# Patient Record
Sex: MALE | Race: BLACK OR AFRICAN AMERICAN | NOT HISPANIC OR LATINO | Employment: OTHER | URBAN - METROPOLITAN AREA
[De-identification: names, ages, dates, MRNs, and addresses within clinical notes are randomized per-mention and may not be internally consistent; named-entity substitution may affect disease eponyms.]

---

## 2024-08-24 ENCOUNTER — HOSPITAL ENCOUNTER (OUTPATIENT)
Facility: HOSPITAL | Age: 69
Setting detail: OBSERVATION
End: 2024-08-24
Attending: EMERGENCY MEDICINE | Admitting: NURSE PRACTITIONER
Payer: COMMERCIAL

## 2024-08-24 ENCOUNTER — CLINICAL SUPPORT (OUTPATIENT)
Dept: EMERGENCY MEDICINE | Facility: HOSPITAL | Age: 69
End: 2024-08-24
Payer: COMMERCIAL

## 2024-08-24 ENCOUNTER — APPOINTMENT (OUTPATIENT)
Dept: RADIOLOGY | Facility: HOSPITAL | Age: 69
End: 2024-08-24
Payer: COMMERCIAL

## 2024-08-24 DIAGNOSIS — N39.0 URINARY TRACT INFECTION WITH HEMATURIA, SITE UNSPECIFIED: ICD-10-CM

## 2024-08-24 DIAGNOSIS — M54.89 BACK PAIN WITHOUT SCIATICA: ICD-10-CM

## 2024-08-24 DIAGNOSIS — R31.9 URINARY TRACT INFECTION WITH HEMATURIA, SITE UNSPECIFIED: ICD-10-CM

## 2024-08-24 DIAGNOSIS — M54.50 ACUTE RIGHT-SIDED LOW BACK PAIN WITHOUT SCIATICA: Primary | ICD-10-CM

## 2024-08-24 PROBLEM — G47.30 SLEEP APNEA: Status: ACTIVE | Noted: 2024-08-24

## 2024-08-24 PROBLEM — E66.01 OBESITY, CLASS III, BMI 40-49.9 (MORBID OBESITY) (MULTI): Status: ACTIVE | Noted: 2018-05-14

## 2024-08-24 LAB
ALBUMIN SERPL BCP-MCNC: 3.7 G/DL (ref 3.4–5)
ALBUMIN SERPL BCP-MCNC: 3.7 G/DL (ref 3.4–5)
ALP SERPL-CCNC: 57 U/L (ref 33–136)
ALP SERPL-CCNC: 58 U/L (ref 33–136)
ALT SERPL W P-5'-P-CCNC: 7 U/L (ref 10–52)
ALT SERPL W P-5'-P-CCNC: 8 U/L (ref 10–52)
ANION GAP SERPL CALC-SCNC: 10 MMOL/L (ref 10–20)
APPEARANCE UR: ABNORMAL
AST SERPL W P-5'-P-CCNC: 16 U/L (ref 9–39)
AST SERPL W P-5'-P-CCNC: 19 U/L (ref 9–39)
ATRIAL RATE: 77 BPM
BACTERIA #/AREA URNS AUTO: ABNORMAL /HPF
BASOPHILS # BLD AUTO: 0.04 X10*3/UL (ref 0–0.1)
BASOPHILS NFR BLD AUTO: 0.6 %
BILIRUB DIRECT SERPL-MCNC: 0.2 MG/DL (ref 0–0.3)
BILIRUB SERPL-MCNC: 0.5 MG/DL (ref 0–1.2)
BILIRUB SERPL-MCNC: 0.6 MG/DL (ref 0–1.2)
BILIRUB UR STRIP.AUTO-MCNC: NEGATIVE MG/DL
BNP SERPL-MCNC: 107 PG/ML (ref 0–99)
BUN SERPL-MCNC: 21 MG/DL (ref 6–23)
CALCIUM SERPL-MCNC: 8.8 MG/DL (ref 8.6–10.6)
CHLORIDE SERPL-SCNC: 100 MMOL/L (ref 98–107)
CO2 SERPL-SCNC: 35 MMOL/L (ref 21–32)
COLOR UR: YELLOW
CREAT SERPL-MCNC: 0.89 MG/DL (ref 0.5–1.3)
CRP SERPL-MCNC: 0.77 MG/DL
EGFRCR SERPLBLD CKD-EPI 2021: >90 ML/MIN/1.73M*2
EOSINOPHIL # BLD AUTO: 0.05 X10*3/UL (ref 0–0.7)
EOSINOPHIL NFR BLD AUTO: 0.8 %
ERYTHROCYTE [DISTWIDTH] IN BLOOD BY AUTOMATED COUNT: 21.3 % (ref 11.5–14.5)
GLUCOSE SERPL-MCNC: 89 MG/DL (ref 74–99)
GLUCOSE UR STRIP.AUTO-MCNC: NORMAL MG/DL
GRAN CASTS #/AREA UR COMP ASSIST: ABNORMAL /LPF
HCT VFR BLD AUTO: 32.7 % (ref 41–52)
HGB BLD-MCNC: 9.4 G/DL (ref 13.5–17.5)
HOLD SPECIMEN: NORMAL
HYALINE CASTS #/AREA URNS AUTO: ABNORMAL /LPF
HYPOCHROMIA BLD QL SMEAR: NORMAL
IMM GRANULOCYTES # BLD AUTO: 0.02 X10*3/UL (ref 0–0.7)
IMM GRANULOCYTES NFR BLD AUTO: 0.3 % (ref 0–0.9)
KETONES UR STRIP.AUTO-MCNC: NEGATIVE MG/DL
LEUKOCYTE ESTERASE UR QL STRIP.AUTO: ABNORMAL
LYMPHOCYTES # BLD AUTO: 0.69 X10*3/UL (ref 1.2–4.8)
LYMPHOCYTES NFR BLD AUTO: 11.1 %
MCH RBC QN AUTO: 21.5 PG (ref 26–34)
MCHC RBC AUTO-ENTMCNC: 28.7 G/DL (ref 32–36)
MCV RBC AUTO: 75 FL (ref 80–100)
MONOCYTES # BLD AUTO: 0.58 X10*3/UL (ref 0.1–1)
MONOCYTES NFR BLD AUTO: 9.3 %
MUCOUS THREADS #/AREA URNS AUTO: ABNORMAL /LPF
NEUTROPHILS # BLD AUTO: 4.86 X10*3/UL (ref 1.2–7.7)
NEUTROPHILS NFR BLD AUTO: 77.9 %
NITRITE UR QL STRIP.AUTO: NEGATIVE
NRBC BLD-RTO: 0 /100 WBCS (ref 0–0)
P AXIS: 66 DEGREES
P OFFSET: 131 MS
P ONSET: 93 MS
PH UR STRIP.AUTO: 6.5 [PH]
PLATELET # BLD AUTO: 111 X10*3/UL (ref 150–450)
POTASSIUM SERPL-SCNC: 3.8 MMOL/L (ref 3.5–5.3)
PR INTERVAL: 262 MS
PROT SERPL-MCNC: 6.8 G/DL (ref 6.4–8.2)
PROT SERPL-MCNC: 6.9 G/DL (ref 6.4–8.2)
PROT UR STRIP.AUTO-MCNC: ABNORMAL MG/DL
Q ONSET: 224 MS
QRS COUNT: 12 BEATS
QRS DURATION: 82 MS
QT INTERVAL: 394 MS
QTC CALCULATION(BAZETT): 445 MS
QTC FREDERICIA: 428 MS
R AXIS: 5 DEGREES
RBC # BLD AUTO: 4.37 X10*6/UL (ref 4.5–5.9)
RBC # UR STRIP.AUTO: ABNORMAL /UL
RBC #/AREA URNS AUTO: ABNORMAL /HPF
RBC MORPH BLD: NORMAL
SARS-COV-2 RNA RESP QL NAA+PROBE: NOT DETECTED
SODIUM SERPL-SCNC: 141 MMOL/L (ref 136–145)
SP GR UR STRIP.AUTO: 1.02
T AXIS: 29 DEGREES
T OFFSET: 421 MS
UROBILINOGEN UR STRIP.AUTO-MCNC: NORMAL MG/DL
VENTRICULAR RATE: 77 BPM
WBC # BLD AUTO: 6.2 X10*3/UL (ref 4.4–11.3)
WBC #/AREA URNS AUTO: >50 /HPF

## 2024-08-24 PROCEDURE — 71046 X-RAY EXAM CHEST 2 VIEWS: CPT | Performed by: RADIOLOGY

## 2024-08-24 PROCEDURE — 87635 SARS-COV-2 COVID-19 AMP PRB: CPT | Performed by: EMERGENCY MEDICINE

## 2024-08-24 PROCEDURE — 96372 THER/PROPH/DIAG INJ SC/IM: CPT

## 2024-08-24 PROCEDURE — 99285 EMERGENCY DEPT VISIT HI MDM: CPT | Performed by: EMERGENCY MEDICINE

## 2024-08-24 PROCEDURE — 86140 C-REACTIVE PROTEIN: CPT | Performed by: NURSE PRACTITIONER

## 2024-08-24 PROCEDURE — 36415 COLL VENOUS BLD VENIPUNCTURE: CPT

## 2024-08-24 PROCEDURE — G0378 HOSPITAL OBSERVATION PER HR: HCPCS

## 2024-08-24 PROCEDURE — 85025 COMPLETE CBC W/AUTO DIFF WBC: CPT

## 2024-08-24 PROCEDURE — 2500000001 HC RX 250 WO HCPCS SELF ADMINISTERED DRUGS (ALT 637 FOR MEDICARE OP): Performed by: NURSE PRACTITIONER

## 2024-08-24 PROCEDURE — 80076 HEPATIC FUNCTION PANEL: CPT | Mod: CCI | Performed by: NURSE PRACTITIONER

## 2024-08-24 PROCEDURE — 2500000005 HC RX 250 GENERAL PHARMACY W/O HCPCS

## 2024-08-24 PROCEDURE — 80053 COMPREHEN METABOLIC PANEL: CPT

## 2024-08-24 PROCEDURE — 99285 EMERGENCY DEPT VISIT HI MDM: CPT | Mod: 25

## 2024-08-24 PROCEDURE — 74177 CT ABD & PELVIS W/CONTRAST: CPT | Mod: FOREIGN READ | Performed by: RADIOLOGY

## 2024-08-24 PROCEDURE — 99222 1ST HOSP IP/OBS MODERATE 55: CPT | Performed by: NURSE PRACTITIONER

## 2024-08-24 PROCEDURE — 83880 ASSAY OF NATRIURETIC PEPTIDE: CPT

## 2024-08-24 PROCEDURE — 81001 URINALYSIS AUTO W/SCOPE: CPT

## 2024-08-24 PROCEDURE — 2500000004 HC RX 250 GENERAL PHARMACY W/ HCPCS (ALT 636 FOR OP/ED)

## 2024-08-24 PROCEDURE — 2550000001 HC RX 255 CONTRASTS: Performed by: EMERGENCY MEDICINE

## 2024-08-24 PROCEDURE — 93005 ELECTROCARDIOGRAM TRACING: CPT

## 2024-08-24 PROCEDURE — 71046 X-RAY EXAM CHEST 2 VIEWS: CPT

## 2024-08-24 PROCEDURE — 74177 CT ABD & PELVIS W/CONTRAST: CPT

## 2024-08-24 PROCEDURE — 96375 TX/PRO/DX INJ NEW DRUG ADDON: CPT

## 2024-08-24 PROCEDURE — 87086 URINE CULTURE/COLONY COUNT: CPT

## 2024-08-24 PROCEDURE — 96365 THER/PROPH/DIAG IV INF INIT: CPT | Mod: 59

## 2024-08-24 RX ORDER — FLUTICASONE PROPIONATE AND SALMETEROL 50; 250 UG/1; UG/1
1 POWDER RESPIRATORY (INHALATION)
COMMUNITY
Start: 2023-10-30 | End: 2024-08-26 | Stop reason: HOSPADM

## 2024-08-24 RX ORDER — FLUTICASONE FUROATE AND VILANTEROL 200; 25 UG/1; UG/1
1 POWDER RESPIRATORY (INHALATION)
Status: DISCONTINUED | OUTPATIENT
Start: 2024-08-25 | End: 2024-08-25

## 2024-08-24 RX ORDER — ACETAMINOPHEN 325 MG/1
650 TABLET ORAL EVERY 6 HOURS PRN
Status: DISCONTINUED | OUTPATIENT
Start: 2024-08-24 | End: 2024-08-26 | Stop reason: HOSPADM

## 2024-08-24 RX ORDER — MORPHINE SULFATE 4 MG/ML
4 INJECTION INTRAVENOUS ONCE
Status: COMPLETED | OUTPATIENT
Start: 2024-08-24 | End: 2024-08-24

## 2024-08-24 RX ORDER — ACETAMINOPHEN 325 MG/1
975 TABLET ORAL ONCE
Status: COMPLETED | OUTPATIENT
Start: 2024-08-24 | End: 2024-08-24

## 2024-08-24 RX ORDER — PANTOPRAZOLE SODIUM 40 MG/1
40 TABLET, DELAYED RELEASE ORAL DAILY
Status: DISCONTINUED | OUTPATIENT
Start: 2024-08-24 | End: 2024-08-26 | Stop reason: HOSPADM

## 2024-08-24 RX ORDER — FUROSEMIDE 10 MG/ML
40 INJECTION INTRAMUSCULAR; INTRAVENOUS ONCE
Status: COMPLETED | OUTPATIENT
Start: 2024-08-24 | End: 2024-08-24

## 2024-08-24 RX ORDER — ACETAMINOPHEN 500 MG
5 TABLET ORAL NIGHTLY PRN
Status: DISCONTINUED | OUTPATIENT
Start: 2024-08-24 | End: 2024-08-26 | Stop reason: HOSPADM

## 2024-08-24 RX ORDER — LIDOCAINE 560 MG/1
1 PATCH PERCUTANEOUS; TOPICAL; TRANSDERMAL ONCE
Status: COMPLETED | OUTPATIENT
Start: 2024-08-24 | End: 2024-08-24

## 2024-08-24 RX ORDER — OXYCODONE HYDROCHLORIDE 5 MG/1
5 TABLET ORAL EVERY 6 HOURS PRN
Status: DISCONTINUED | OUTPATIENT
Start: 2024-08-24 | End: 2024-08-24

## 2024-08-24 RX ORDER — AMOXICILLIN 250 MG
2 CAPSULE ORAL 2 TIMES DAILY
Status: DISCONTINUED | OUTPATIENT
Start: 2024-08-24 | End: 2024-08-26 | Stop reason: HOSPADM

## 2024-08-24 RX ORDER — METHOCARBAMOL 500 MG/1
500 TABLET, FILM COATED ORAL EVERY 8 HOURS PRN
Status: DISCONTINUED | OUTPATIENT
Start: 2024-08-24 | End: 2024-08-26 | Stop reason: HOSPADM

## 2024-08-24 RX ORDER — OXYCODONE HYDROCHLORIDE 5 MG/1
5 TABLET ORAL EVERY 6 HOURS PRN
Status: DISCONTINUED | OUTPATIENT
Start: 2024-08-24 | End: 2024-08-26 | Stop reason: HOSPADM

## 2024-08-24 RX ORDER — FUROSEMIDE 40 MG/1
40 TABLET ORAL DAILY
Status: DISCONTINUED | OUTPATIENT
Start: 2024-08-25 | End: 2024-08-26 | Stop reason: HOSPADM

## 2024-08-24 RX ORDER — CEFTRIAXONE 1 G/50ML
1 INJECTION, SOLUTION INTRAVENOUS EVERY 24 HOURS
Status: DISCONTINUED | OUTPATIENT
Start: 2024-08-25 | End: 2024-08-26 | Stop reason: HOSPADM

## 2024-08-24 RX ORDER — ORPHENADRINE CITRATE 30 MG/ML
60 INJECTION INTRAMUSCULAR; INTRAVENOUS ONCE
Status: COMPLETED | OUTPATIENT
Start: 2024-08-24 | End: 2024-08-24

## 2024-08-24 RX ORDER — ENOXAPARIN SODIUM 100 MG/ML
40 INJECTION SUBCUTANEOUS EVERY 24 HOURS
Status: DISCONTINUED | OUTPATIENT
Start: 2024-08-24 | End: 2024-08-26 | Stop reason: HOSPADM

## 2024-08-24 RX ORDER — CEFTRIAXONE 1 G/50ML
1 INJECTION, SOLUTION INTRAVENOUS ONCE
Status: COMPLETED | OUTPATIENT
Start: 2024-08-24 | End: 2024-08-24

## 2024-08-24 RX ORDER — METHOCARBAMOL 500 MG/1
500 TABLET, FILM COATED ORAL EVERY 8 HOURS SCHEDULED
Status: DISCONTINUED | OUTPATIENT
Start: 2024-08-24 | End: 2024-08-24

## 2024-08-24 RX ORDER — FUROSEMIDE 40 MG/1
40 TABLET ORAL DAILY
COMMUNITY
Start: 2024-07-26

## 2024-08-24 RX ORDER — LISINOPRIL 30 MG/1
30 TABLET ORAL DAILY
COMMUNITY
Start: 2024-07-26

## 2024-08-24 RX ADMIN — PANTOPRAZOLE SODIUM 40 MG: 40 TABLET, DELAYED RELEASE ORAL at 21:03

## 2024-08-24 RX ADMIN — LIDOCAINE 1 PATCH: 4 PATCH TOPICAL at 08:18

## 2024-08-24 RX ADMIN — IOHEXOL 90 ML: 350 INJECTION, SOLUTION INTRAVENOUS at 14:26

## 2024-08-24 RX ADMIN — ACETAMINOPHEN 975 MG: 325 TABLET ORAL at 08:17

## 2024-08-24 RX ADMIN — ORPHENADRINE CITRATE 60 MG: 60 INJECTION INTRAMUSCULAR; INTRAVENOUS at 08:17

## 2024-08-24 RX ADMIN — CEFTRIAXONE SODIUM 1 G: 1 INJECTION, SOLUTION INTRAVENOUS at 10:57

## 2024-08-24 RX ADMIN — MORPHINE SULFATE 4 MG: 4 INJECTION INTRAVENOUS at 10:51

## 2024-08-24 RX ADMIN — SENNOSIDES AND DOCUSATE SODIUM 2 TABLET: 50; 8.6 TABLET ORAL at 21:03

## 2024-08-24 RX ADMIN — FUROSEMIDE 40 MG: 10 INJECTION, SOLUTION INTRAMUSCULAR; INTRAVENOUS at 15:15

## 2024-08-24 ASSESSMENT — ENCOUNTER SYMPTOMS
BACK PAIN: 1
CONFUSION: 0
ABDOMINAL PAIN: 0
DIFFICULTY URINATING: 0
VOMITING: 0
NAUSEA: 0
CONSTIPATION: 0

## 2024-08-24 ASSESSMENT — COLUMBIA-SUICIDE SEVERITY RATING SCALE - C-SSRS
2. HAVE YOU ACTUALLY HAD ANY THOUGHTS OF KILLING YOURSELF?: NO
1. IN THE PAST MONTH, HAVE YOU WISHED YOU WERE DEAD OR WISHED YOU COULD GO TO SLEEP AND NOT WAKE UP?: NO
6. HAVE YOU EVER DONE ANYTHING, STARTED TO DO ANYTHING, OR PREPARED TO DO ANYTHING TO END YOUR LIFE?: NO

## 2024-08-24 ASSESSMENT — PAIN SCALES - GENERAL
PAINLEVEL_OUTOF10: 5 - MODERATE PAIN
PAINLEVEL_OUTOF10: 8
PAINLEVEL_OUTOF10: 5 - MODERATE PAIN

## 2024-08-24 ASSESSMENT — PAIN DESCRIPTION - PAIN TYPE
TYPE: ACUTE PAIN
TYPE: ACUTE PAIN

## 2024-08-24 ASSESSMENT — PAIN DESCRIPTION - ORIENTATION
ORIENTATION: RIGHT
ORIENTATION: RIGHT

## 2024-08-24 ASSESSMENT — PAIN - FUNCTIONAL ASSESSMENT
PAIN_FUNCTIONAL_ASSESSMENT: 0-10
PAIN_FUNCTIONAL_ASSESSMENT: 0-10

## 2024-08-24 ASSESSMENT — PAIN DESCRIPTION - LOCATION
LOCATION: BACK
LOCATION: BACK

## 2024-08-24 NOTE — ED TRIAGE NOTES
Patient here via [facility]. Patient c/o 5/10 right lower back pain that he has for a couple of days, but says get got really bad this morning. Patient has a hx of CHF (on 3Liters baseline)

## 2024-08-24 NOTE — ED PROVIDER NOTES
Emergency Department Provider Note        History of Present Illness     History provided by: Patient  Limitations to History: None  External Records Reviewed with Brief Summary: Care Everywhere visit at [facility] on 8/22/2016 which showed PMHx HTN, visit for foot pain ; telephone encounter from [facility] 8/25/2016 noting patient in cardiac ICU and [facility] has been asked to fax records    HPI:  Neftali Beasley is a 69 y.o. male with reported PMHx CHF, CKD, HTN who presents to the emergency department for back pain.  He reports right sided low back pain that started as a dull ache yesterday after getting off the toilet.  He reports when he woke up this morning and tried to get up it became significantly more intense.  He reports the pain does not radiate, he has no decreased strength in his lower extremities, no numbness/tingling.  Reports no fever/chills.    No HA, lightheadedness, CP, SOB, abdominal pain, dysuria.  He does report bilateral leg swelling which is chronic. Patient reports he uses 2.5L O2 at home.     On chart review, patient has a history of HTN, obesity, prior UTI.  There is mention of a cardiac ICU stay in 2016, however no notes are available from this hospitalization.  Reports no alcohol or recreational drug use, no history of IVDU.    Physical Exam   Triage vitals:  T 35.8 °C (96.4 °F)  HR 78  /67  RR 18  O2 (!) 91 % (S) Supplemental oxygen (3 Liters)    Physical exam:   Triage vitals reviewed.  Constitutional: Well developed adult in no acute distress, non toxic in appearance  Head: Normocephalic, atraumatic  Skin: Intact, dry. No rashes or lesions.  Eyes: Pupils are equal. No conjunctival injection.  Neck: Supple. Trachea is midline.  Pulmonary: Normal work of breathing with no accessory muscle use noted.  Bibasilar crackles on auscultation, no wheezes.  Cardiovascular: Normal rate, regular rhythm. No murmurs/gallops/rubs appreciated. 2+ radial and DP pulses bilaterally.    Abdomen: Soft, nondistended. Nontender to palpation.  Back: Mild tenderness to palpation over right lumbar paraspinal area, no midline tenderness to palpation  Extremities: No gross deformities.  Moving all extremities spontaneously without difficulty.  Neuro: Awake and alert. Face is symmetric. Speech is clear. No obvious focal findings.  Psych: Appropriate mood and affect.      Medical Decision Making & ED Course   Medical Decision Makin y.o. male with reported PMHx CHF, CKD, HTN who presents to the emergency department for right low back pain.  On arrival, patient was afebrile, hemodynamically stable, however saturating 91% on home 2 L NC.  O2 increased to 3L with subsequent improvement in oxygen saturation to 96%.  He was given a lidocaine patch, 975 mg Tylenol, and 60 mg Norflex for pain, with some improvement.  Exam as above.    Exam seems most consistent with lumbar strain versus CVA tenderness due to pyelonephritis.  No midline tenderness or recent trauma so low concern for acute fracture.  No LE weakness, saddle anesthesia, or decree sensation, so low concern for cord compression.  No fever/chills, no history of IVDU, so low concern for epidural abscess.    Laboratory workup shows CBC without leukocytosis, shows anemia with Hgb 9.4.  CMP unremarkable.  UA positive for UTI and patient was started on ceftriaxone.  EKG nonischemic.  BNP very slightly elevated at 107, however CXR shows diffuse interstitial opacities and trace bilateral pleural effusions concerning for volume overload in the setting of CHF.  I suspect that this is the cause of his increase in O2 requirement.  Patient will be given 40 mg IV Lasix to initiate diuresis.  Concern for pyelo versus nephrolithiasis given UTI and blood on UA, so CT AP was obtained and showed hypoattenuating lesion of the left kidney that is incompletely characterized but concerning for cyst as well as a 1.7 cm nonobstructing stone in the left kidney; no right  kidney pathology.  CT also showed hepatic steatosis and diverticulosis without diverticulitis.     Discussed the likely diagnoses of UTI and volume overload with the patient, who expressed understanding.  I recommended admission to the hospital given his increased O2 requirement and patient was in agreement with this plan.  Discussed with admission coordinator and patient was admitted to medicine in stable condition.    ----    Differential diagnoses considered include but are not limited to: Back strain, UTI, pyelonephritis, nephrolithiasis, lumbar fracture, cord compression, epidural abscess, malignancy     Social Determinants of Health which Significantly Impact Care: None identified     EKG Independent Interpretation: EKG interpreted by myself. Please see ED Course for full interpretation.    Independent Result Review and Interpretation: Relevant laboratory and radiographic results were reviewed and independently interpreted by myself.  As necessary, they are commented on in the ED Course.    Chronic conditions affecting the patient's care: As documented above in [facility]    The patient was discussed with the following consultants/services: Admission Coordinator who accepted the patient for admission    Care Considerations: As documented above in [facility]    ED Course:  ED Course as of 08/24/24 2113   Sat Aug 24, 2024   0901 ECG 12 lead  EKG interpreted by me (ED resident): 77 bpm, sinus arrhythmia with first-degree block and PVC.  Normal axis.  , QTc 445, QRS 82.  No ST elevations, depressions, or T wave inversions concerning for ischemia.  Compared with prior EKG on 9/4/2016, PVCs are now present; no other significant changes. [HH]      ED Course User Index  [HH] Sylvie Rondon MD         Diagnoses as of 08/24/24 2113   Acute right-sided low back pain without sciatica   Urinary tract infection with hematuria, site unspecified     Disposition   As a result of their workup, the patient will require admission to the  [facility].  The patient was informed of his diagnosis.  The patient was given the opportunity to ask questions and I answered them. The patient agreed to be admitted to the hospital.      Patient seen and discussed with ED attending physician.    Sylvie Rondon MD  Emergency Medicine     Sylvie Rondon MD  Resident  08/24/24 [id number]

## 2024-08-24 NOTE — H&P
History Of Present Illness  Neftali Beasley is a 69 y.o. male with a PMH of HFpEF (last EF 70-75% 8/22/16), ROSALINDA, HTN, and obesity. Pt presented to ED this morning for further evaluation of right sided lower back pain that started yesterday while he was getting off the toilet. Pt reports that when he attempted to get out of bed this morning pain was more intense. Back pain does not radiate per report of pt and it is no associated with numbness or tingling, fever, chills, loss of bowel/bladder function, or decreased strength in BLE. Pt also endorses BLE edema which his chronic. Pt currently on 2.5L NC which he wears at baseline. Labs obtained on admit notable for UTI, elevated BNP, anemia, and thrombocytopenia. CT A/P with contrast showed gastritis, non-obstructing kidney stone, hepatic steatosis with morphologic changes of underlying cirrhosis, and colonic diverticulosis without findings of diverticulitis. CXR showed diffuse interstitial opacities throughout the bilateral lungs with trace bilateral pleural effusions which may represent mild pulmonary edema. Lasix 40 mg IV push x1 dose given while in the ED. IV antibiotic initiated while in the ED for treatment of UTI. Pt admitted to medicine service under observation for treatment of UTI and back pain.      Past Medical History  No past medical history on file.    Surgical History  No past surgical history on file.     Social History  He has no history on file for tobacco use, alcohol use, and drug use.    Family History  No family history on file.     Allergies  Patient has no known allergies.    Review of Systems   Cardiovascular:  Positive for leg swelling. Negative for chest pain.   Gastrointestinal:  Negative for abdominal pain, constipation, nausea and vomiting.   Genitourinary:  Negative for difficulty urinating.   Musculoskeletal:  Positive for back pain.   Skin: Negative.    Psychiatric/Behavioral:  Negative for confusion.         Physical  Exam  Constitutional:       General: He is not in acute distress.     Appearance: He is obese.   HENT:      Head: Normocephalic.      Nose: Nose normal.      Mouth/Throat:      Mouth: Mucous membranes are dry.   Cardiovascular:      Rate and Rhythm: Normal rate.      Pulses: Normal pulses.      Heart sounds: Normal heart sounds.   Pulmonary:      Effort: Pulmonary effort is normal. No respiratory distress.      Breath sounds: Normal breath sounds.      Comments: Pt currently on 2.5 L  Abdominal:      Palpations: Abdomen is soft.   Musculoskeletal:         General: Normal range of motion.      Cervical back: Normal range of motion.      Right lower leg: Edema present.      Left lower leg: Edema present.   Skin:     General: Skin is warm and dry.   Neurological:      Mental Status: He is oriented to person, place, and time.        Last Recorded Vitals  Blood pressure 145/75, pulse 74, temperature 36 °C (96.8 °F), temperature source Oral, resp. rate 19, SpO2 100%.    Relevant Results    CT abdomen pelvis w IV contrast    Result Date: 8/24/2024  STUDY: CT Abdomen and Pelvis with IV Contrast; 8/24/2024, 1433 INDICATION: Right flank pain. COMPARISON: None Available. ACCESSION NUMBER(S): [id number] ORDERING CLINICIAN: VANESSA PUCKETT TECHNIQUE: CT of the abdomen and pelvis was performed.  Contiguous axial images were obtained at 3 mm slice thickness through the abdomen and pelvis. Coronal and sagittal reconstructions at 3 mm slice thickness were performed.  Omnipaque 350, 90 mL was administered intravenously.  FINDINGS: LOWER CHEST: No cardiomegaly.  No pericardial effusion.  Elevation of the right hemidiaphragm with associated right basilar atelectasis.  Minimal coronary calcifications  ABDOMEN:  LIVER: No hepatomegaly.  Nodular contour the liver compatible with underlying cirrhosis  There is fatty infiltration of the liver  BILE DUCTS: No intrahepatic or extrahepatic biliary ductal dilatation.  GALLBLADDER: The  gallbladder is present without definite gallstones. STOMACH: Question gastric wall thickening.  PANCREAS: No masses or ductal dilatation.  SPLEEN: No splenomegaly or focal splenic lesion.  ADRENAL GLANDS: No thickening or nodules.  KIDNEYS AND URETERS: Kidneys are normal in size and location.  4.4 cm low-attenuation lesion in the inferior pole left kidney measuring above water attenuation.  Nonobstructing 1.7 cm calculus in the left kidney without ureteral calculus or hydronephrosis.  PELVIS:  BLADDER: No abnormalities identified.  REPRODUCTIVE ORGANS: No abnormalities identified.  BOWEL: Colonic diverticulosis without findings of diverticulitis.  Appendix is normal.  VESSELS: No abnormalities identified.  Abdominal aorta is normal in caliber.  PERITONEUM/RETROPERITONEUM/LYMPH NODES: No free fluid.  No pneumoperitoneum. No lymphadenopathy.  ABDOMINAL WALL: No abnormalities identified. SOFT TISSUES: No abnormalities identified.  BONES: No acute fracture or aggressive osseous lesion.  Multilevel degenerative changes of the lumbar spine.    1. Mild gastric wall thickening. Correlate clinically for gastritis. 2. 4.4 cm low-attenuation lesion in the inferior pole left kidney measuring above water attenuation. Findings may be secondary to a hyperdense cyst but technically indeterminate. Recommend further evaluation with renal ultrasound. 3. Nonobstructing 1.7 cm calculus in the left kidney without ureteral calculus or hydronephrosis. 4. Hepatic steatosis with morphologic changes of underlying cirrhosis. 5. Colonic diverticulosis without findings of diverticulitis. 6. Minimal coronary artery calcifications. Signed by Garland Vasquez MD    XR chest 2 views    Result Date: 8/24/2024  Interpreted By:  Sourav Horner,  and Poly Eaton STUDY: XR CHEST 2 VIEWS;  8/24/2024 8:46 am   INDICATION: Signs/Symptoms:Back pain, increased O2 requirement.   COMPARISON: Several prior chest radiographs most recently 09/11/2016   ACCESSION  NUMBER(S): [id number]   ORDERING CLINICIAN: NEVAEH DRAPER   FINDINGS: PA and lateral radiographs of the chest were provided.     CARDIOMEDIASTINAL SILHOUETTE: Prominent cardiomediastinal silhouette, likely secondary to patient positioning, similar in appearance as compared to most recent prior.   LUNGS: Low lung volumes contributing to bronchovascular crowding. Diffuse interstitial opacities throughout the bilateral lungs. Blunting of the bilateral costophrenic angles. No evidence of pneumothorax.   ABDOMEN: No remarkable upper abdominal findings.   BONES: No acute osseous changes.       1.  Diffuse interstitial opacities throughout the bilateral lungs with trace bilateral pleural effusions, which may represent mild pulmonary edema. 2. Prominent cardiomediastinal silhouette, likely secondary to low lung volumes and patient positioning, similar in appearance as compared to the most recent prior.   I personally reviewed the images/study and resident's interpretation and I agree with the findings as stated by Uma Pang MD (resident radiologist). This study was analyzed and interpreted at [place], [place].   MACRO: None   Signed by: Sourav Horner 8/24/2024 9:40 AM Dictation workstation:   [id number]      Results for orders placed or performed during the hospital encounter of 08/24/24 (from the past 24 hour(s))   CBC and Auto Differential   Result Value Ref Range    WBC 6.2 4.4 - 11.3 x10*3/uL    nRBC 0.0 0.0 - 0.0 /100 WBCs    RBC 4.37 (L) 4.50 - 5.90 x10*6/uL    Hemoglobin 9.4 (L) 13.5 - 17.5 g/dL    Hematocrit 32.7 (L) 41.0 - 52.0 %    MCV 75 (L) 80 - 100 fL    MCH 21.5 (L) 26.0 - 34.0 pg    MCHC 28.7 (L) 32.0 - 36.0 g/dL    RDW 21.3 (H) 11.5 - 14.5 %    Platelets 111 (L) 150 - 450 x10*3/uL    Neutrophils % 77.9 40.0 - 80.0 %    Immature Granulocytes %, Automated 0.3 0.0 - 0.9 %    Lymphocytes % 11.1 13.0 - 44.0 %    Monocytes % 9.3 2.0 - 10.0 %    Eosinophils % 0.8  0.0 - 6.0 %    Basophils % 0.6 0.0 - 2.0 %    Neutrophils Absolute 4.86 1.20 - 7.70 x10*3/uL    Immature Granulocytes Absolute, Automated 0.02 0.00 - 0.70 x10*3/uL    Lymphocytes Absolute 0.69 (L) 1.20 - 4.80 x10*3/uL    Monocytes Absolute 0.58 0.10 - 1.00 x10*3/uL    Eosinophils Absolute 0.05 0.00 - 0.70 x10*3/uL    Basophils Absolute 0.04 0.00 - 0.10 x10*3/uL   B-type natriuretic peptide   Result Value Ref Range     (H) 0 - 99 pg/mL   Morphology   Result Value Ref Range    RBC Morphology See Below     Hypochromia Marked    Sars-CoV-2 PCR   Result Value Ref Range    Coronavirus 2019, PCR Not Detected Not Detected   Urinalysis with Reflex Culture and Microscopic   Result Value Ref Range    Color, Urine Yellow Light-Yellow, Yellow, Dark-Yellow    Appearance, Urine Turbid (N) Clear    Specific Gravity, Urine 1.024 1.005 - 1.035    pH, Urine 6.5 5.0, 5.5, 6.0, 6.5, 7.0, 7.5, 8.0    Protein, Urine 50 (1+) (A) NEGATIVE, 10 (TRACE), 20 (TRACE) mg/dL    Glucose, Urine Normal Normal mg/dL    Blood, Urine 0.03 (TRACE) (A) NEGATIVE    Ketones, Urine NEGATIVE NEGATIVE mg/dL    Bilirubin, Urine NEGATIVE NEGATIVE    Urobilinogen, Urine Normal Normal mg/dL    Nitrite, Urine NEGATIVE NEGATIVE    Leukocyte Esterase, Urine 500 Carlos/µL (A) NEGATIVE   Microscopic Only, Urine   Result Value Ref Range    WBC, Urine >50 (A) 1-5, NONE /HPF    RBC, Urine 6-10 (A) NONE, 1-2, 3-5 /HPF    Bacteria, Urine 1+ (A) NONE SEEN /HPF    Mucus, Urine FEW Reference range not established. /LPF    Hyaline Casts, Urine 1+ (A) NONE /LPF    Fine Granular Casts, Urine 1+ (A) NONE /LPF   Comprehensive metabolic panel   Result Value Ref Range    Glucose 89 74 - 99 mg/dL    Sodium 141 136 - 145 mmol/L    Potassium 3.8 3.5 - 5.3 mmol/L    Chloride 100 98 - 107 mmol/L    Bicarbonate 35 (H) 21 - 32 mmol/L    Anion Gap 10 10 - 20 mmol/L    Urea Nitrogen 21 6 - 23 mg/dL    Creatinine 0.89 0.50 - 1.30 mg/dL    eGFR >90 >60 mL/min/1.73m*2    Calcium 8.8 8.6 -  10.6 mg/dL    Albumin 3.7 3.4 - 5.0 g/dL    Alkaline Phosphatase 57 33 - 136 U/L    Total Protein 6.9 6.4 - 8.2 g/dL    AST 16 9 - 39 U/L    Bilirubin, Total 0.5 0.0 - 1.2 mg/dL    ALT 7 (L) 10 - 52 U/L           ED Medication Administration from 08/24/2024 0644 to 08/24/2024 1829         Date/Time Order Dose Route Action Action by     08/24/2024 0817 EDT acetaminophen (Tylenol) tablet 975 mg 975 mg oral Given NEDA Farley     08/24/2024 0817 EDT orphenadrine (Norflex) injection 60 mg 60 mg intramuscular Given NEDA Farley     08/24/2024 0818 EDT lidocaine 4 % patch 1 patch 1 patch transdermal Medication Applied NEDA Farley     08/24/2024 1051 EDT morphine injection 4 mg 4 mg intravenous Given SEMAJ Arteaga     08/24/2024 1057 EDT cefTRIAXone (Rocephin) 1 g in dextrose (iso) IV 50 mL 1 g intravenous New Bag NEDA Farley     08/24/2024 1127 EDT cefTRIAXone (Rocephin) 1 g in dextrose (iso) IV 50 mL 0 g intravenous Stopped NEDA Farley     08/24/2024 1426 EDT iohexol (OMNIPaque) 350 mg iodine/mL solution 90 mL 90 mL intravenous Given PHONG Brenner     08/24/2024 1515 EDT furosemide (Lasix) injection 40 mg 40 mg intravenous Given KRISTEN Mason           Scheduled medications  [START ON 8/25/2024] cefTRIAXone, 1 g, intravenous, q24h  enoxaparin, 40 mg, subcutaneous, q24h  [START ON 8/25/2024] fluticasone furoate-vilanteroL, 1 puff, inhalation, Daily  [START ON 8/25/2024] furosemide, 40 mg, oral, Daily  lidocaine, 1 patch, transdermal, Once  [START ON 8/25/2024] lisinopril, 30 mg, oral, Daily  pantoprazole, 40 mg, oral, Daily  sennosides-docusate sodium, 2 tablet, oral, BID      Continuous medications     PRN medications  PRN medications: acetaminophen, melatonin, methocarbamol, oxyCODONE, psyllium       Assessment/Plan     #back pain  - PT/OT consulted  - Tylenol 650 mg q6h PRN mild pain, Oxycodone 5 mg q6h PRN mod-severe pain (x4 doses), Lidocaine patch, and Robaxin 500 mg PO q8h PRN back pain/spasms    #UTI  - Urine culture: Pending results  with sensitivities   - No prior positive urine culture results found in medical record  - Antibiotic Regimen: Rocephin 1g Q24hr (8/24-).Transition to PO antibiotic at discharge   - Monitor kidney function; Provide maintenance fluids if needed; Encourage oral hydration     #gastritis  #fatty liver  - CT A/P: Hepatic steatosis with morphologic changes of underlying cirrhosis.  - additional labs ordered for workup: Hepatic function panel, acute hepatitis panel, coag screen  - pt will need f/u appt scheduled with hepatology on discharge for outpatient workup for cirrhosis  - start Protonix 40 mg PO every day    #HTN  #HFpEF (last EF 70-75% 8/22/16)  -  on admit  - last /75  - continue to monitor BP  - continue home dose of Lisinopril 30 mg PO every day and Lasix 40 mg PO every day  - last Echo done 8/22/16: The left ventricular systolic function is hyperdynamic with a 70-75% estimated ejection fraction. Increased LV mass.Severely increased left ventricular septal thickness. Spectral Doppler shows an abnormal pattern of left ventricular diastolic filling. The left atrium is normal in size. Mildly elevated RVSP. Mildly dilated aortic root. The pulmonary artery is not well visualized.  - pt will need follow-up appt with cardiology to re-establish care and outpatient Echo for further evaluation of HF      Dispo: admit to RNF. Plan per above.      I spent 60 minutes in the professional and overall care of this patient.      Katelin Santiago, APRN-CNP

## 2024-08-25 VITALS
SYSTOLIC BLOOD PRESSURE: 155 MMHG | HEART RATE: 74 BPM | WEIGHT: 315 LBS | OXYGEN SATURATION: 97 % | TEMPERATURE: 98.6 F | DIASTOLIC BLOOD PRESSURE: 83 MMHG | RESPIRATION RATE: 18 BRPM

## 2024-08-25 LAB
ANION GAP SERPL CALC-SCNC: 11 MMOL/L (ref 10–20)
APTT PPP: 32 SECONDS (ref 27–38)
BACTERIA UR CULT: ABNORMAL
BUN SERPL-MCNC: 17 MG/DL (ref 6–23)
CALCIUM SERPL-MCNC: 9 MG/DL (ref 8.6–10.6)
CHLORIDE SERPL-SCNC: 97 MMOL/L (ref 98–107)
CO2 SERPL-SCNC: 34 MMOL/L (ref 21–32)
CREAT SERPL-MCNC: 1.03 MG/DL (ref 0.5–1.3)
EGFRCR SERPLBLD CKD-EPI 2021: 78 ML/MIN/1.73M*2
ERYTHROCYTE [DISTWIDTH] IN BLOOD BY AUTOMATED COUNT: 22 % (ref 11.5–14.5)
GLUCOSE SERPL-MCNC: 90 MG/DL (ref 74–99)
HAV IGM SER QL: NONREACTIVE
HBV CORE IGM SER QL: NONREACTIVE
HBV SURFACE AG SERPL QL IA: NONREACTIVE
HCT VFR BLD AUTO: 36.2 % (ref 41–52)
HCV AB SER QL: NONREACTIVE
HGB BLD-MCNC: 9.5 G/DL (ref 13.5–17.5)
INR PPP: 1 (ref 0.9–1.1)
MCH RBC QN AUTO: 21.9 PG (ref 26–34)
MCHC RBC AUTO-ENTMCNC: 26.2 G/DL (ref 32–36)
MCV RBC AUTO: 83 FL (ref 80–100)
NRBC BLD-RTO: 0 /100 WBCS (ref 0–0)
PLATELET # BLD AUTO: 107 X10*3/UL (ref 150–450)
POTASSIUM SERPL-SCNC: 4.2 MMOL/L (ref 3.5–5.3)
PROTHROMBIN TIME: 11.3 SECONDS (ref 9.8–12.8)
RBC # BLD AUTO: 4.34 X10*6/UL (ref 4.5–5.9)
SODIUM SERPL-SCNC: 138 MMOL/L (ref 136–145)
WBC # BLD AUTO: 5.5 X10*3/UL (ref 4.4–11.3)

## 2024-08-25 PROCEDURE — 85610 PROTHROMBIN TIME: CPT | Performed by: NURSE PRACTITIONER

## 2024-08-25 PROCEDURE — 99233 SBSQ HOSP IP/OBS HIGH 50: CPT | Performed by: STUDENT IN AN ORGANIZED HEALTH CARE EDUCATION/TRAINING PROGRAM

## 2024-08-25 PROCEDURE — 80048 BASIC METABOLIC PNL TOTAL CA: CPT | Performed by: NURSE PRACTITIONER

## 2024-08-25 PROCEDURE — 96372 THER/PROPH/DIAG INJ SC/IM: CPT | Performed by: NURSE PRACTITIONER

## 2024-08-25 PROCEDURE — 2500000005 HC RX 250 GENERAL PHARMACY W/O HCPCS: Performed by: STUDENT IN AN ORGANIZED HEALTH CARE EDUCATION/TRAINING PROGRAM

## 2024-08-25 PROCEDURE — 97161 PT EVAL LOW COMPLEX 20 MIN: CPT | Mod: GP

## 2024-08-25 PROCEDURE — G0378 HOSPITAL OBSERVATION PER HR: HCPCS

## 2024-08-25 PROCEDURE — 85027 COMPLETE CBC AUTOMATED: CPT | Performed by: NURSE PRACTITIONER

## 2024-08-25 PROCEDURE — 2500000001 HC RX 250 WO HCPCS SELF ADMINISTERED DRUGS (ALT 637 FOR MEDICARE OP): Performed by: NURSE PRACTITIONER

## 2024-08-25 PROCEDURE — 80074 ACUTE HEPATITIS PANEL: CPT | Performed by: NURSE PRACTITIONER

## 2024-08-25 PROCEDURE — 2500000004 HC RX 250 GENERAL PHARMACY W/ HCPCS (ALT 636 FOR OP/ED): Performed by: NURSE PRACTITIONER

## 2024-08-25 RX ORDER — ONDANSETRON 4 MG/1
4 TABLET, ORALLY DISINTEGRATING ORAL EVERY 8 HOURS PRN
Status: DISCONTINUED | OUTPATIENT
Start: 2024-08-25 | End: 2024-08-26 | Stop reason: HOSPADM

## 2024-08-25 RX ORDER — ASPIRIN 81 MG/1
81 TABLET ORAL DAILY
COMMUNITY

## 2024-08-25 RX ORDER — ONDANSETRON HYDROCHLORIDE 2 MG/ML
4 INJECTION, SOLUTION INTRAVENOUS EVERY 8 HOURS PRN
Status: DISCONTINUED | OUTPATIENT
Start: 2024-08-25 | End: 2024-08-26 | Stop reason: HOSPADM

## 2024-08-25 RX ADMIN — SENNOSIDES AND DOCUSATE SODIUM 2 TABLET: 50; 8.6 TABLET ORAL at 21:04

## 2024-08-25 RX ADMIN — CEFTRIAXONE SODIUM 1 G: 1 INJECTION, SOLUTION INTRAVENOUS at 11:58

## 2024-08-25 RX ADMIN — ACETAMINOPHEN 650 MG: 325 TABLET ORAL at 10:06

## 2024-08-25 RX ADMIN — ENOXAPARIN SODIUM 40 MG: 100 INJECTION SUBCUTANEOUS at 10:06

## 2024-08-25 RX ADMIN — PSYLLIUM HUSK 1 PACKET: 3.4 POWDER ORAL at 10:02

## 2024-08-25 RX ADMIN — ONDANSETRON 4 MG: 4 TABLET, ORALLY DISINTEGRATING ORAL at 05:17

## 2024-08-25 RX ADMIN — LISINOPRIL 30 MG: 20 TABLET ORAL at 10:06

## 2024-08-25 RX ADMIN — FUROSEMIDE 40 MG: 40 TABLET ORAL at 10:06

## 2024-08-25 SDOH — SOCIAL STABILITY: SOCIAL INSECURITY: ARE YOU OR HAVE YOU BEEN THREATENED OR ABUSED PHYSICALLY, EMOTIONALLY, OR SEXUALLY BY ANYONE?: NO

## 2024-08-25 SDOH — SOCIAL STABILITY: SOCIAL INSECURITY: DO YOU FEEL ANYONE HAS EXPLOITED OR TAKEN ADVANTAGE OF YOU FINANCIALLY OR OF YOUR PERSONAL PROPERTY?: NO

## 2024-08-25 SDOH — SOCIAL STABILITY: SOCIAL INSECURITY: DO YOU FEEL UNSAFE GOING BACK TO THE PLACE WHERE YOU ARE LIVING?: NO

## 2024-08-25 SDOH — SOCIAL STABILITY: SOCIAL INSECURITY: HAVE YOU HAD THOUGHTS OF HARMING ANYONE ELSE?: NO

## 2024-08-25 SDOH — SOCIAL STABILITY: SOCIAL INSECURITY: HAS ANYONE EVER THREATENED TO HURT YOUR FAMILY OR YOUR PETS?: NO

## 2024-08-25 SDOH — SOCIAL STABILITY: SOCIAL INSECURITY: HAVE YOU HAD ANY THOUGHTS OF HARMING ANYONE ELSE?: NO

## 2024-08-25 SDOH — SOCIAL STABILITY: SOCIAL INSECURITY: DOES ANYONE TRY TO KEEP YOU FROM HAVING/CONTACTING OTHER FRIENDS OR DOING THINGS OUTSIDE YOUR HOME?: NO

## 2024-08-25 SDOH — SOCIAL STABILITY: SOCIAL INSECURITY: ABUSE: ADULT

## 2024-08-25 SDOH — SOCIAL STABILITY: SOCIAL INSECURITY: ARE THERE ANY APPARENT SIGNS OF INJURIES/BEHAVIORS THAT COULD BE RELATED TO ABUSE/NEGLECT?: NO

## 2024-08-25 ASSESSMENT — COGNITIVE AND FUNCTIONAL STATUS - GENERAL
DAILY ACTIVITIY SCORE: 20
DRESSING REGULAR UPPER BODY CLOTHING: A LITTLE
MOVING TO AND FROM BED TO CHAIR: A LITTLE
DRESSING REGULAR UPPER BODY CLOTHING: A LITTLE
MOBILITY SCORE: 16
MOVING FROM LYING ON BACK TO SITTING ON SIDE OF FLAT BED WITH BEDRAILS: A LITTLE
WALKING IN HOSPITAL ROOM: A LITTLE
STANDING UP FROM CHAIR USING ARMS: A LITTLE
TURNING FROM BACK TO SIDE WHILE IN FLAT BAD: A LITTLE
CLIMB 3 TO 5 STEPS WITH RAILING: A LITTLE
TURNING FROM BACK TO SIDE WHILE IN FLAT BAD: A LITTLE
STANDING UP FROM CHAIR USING ARMS: A LITTLE
DRESSING REGULAR UPPER BODY CLOTHING: A LITTLE
DRESSING REGULAR LOWER BODY CLOTHING: A LITTLE
CLIMB 3 TO 5 STEPS WITH RAILING: TOTAL
DRESSING REGULAR LOWER BODY CLOTHING: A LITTLE
CLIMB 3 TO 5 STEPS WITH RAILING: A LOT
MOVING FROM LYING ON BACK TO SITTING ON SIDE OF FLAT BED WITH BEDRAILS: A LITTLE
PATIENT BASELINE BEDBOUND: NO
DRESSING REGULAR LOWER BODY CLOTHING: A LITTLE
DAILY ACTIVITIY SCORE: 20
TURNING FROM BACK TO SIDE WHILE IN FLAT BAD: A LITTLE
DAILY ACTIVITIY SCORE: 20
STANDING UP FROM CHAIR USING ARMS: A LITTLE
MOVING FROM LYING ON BACK TO SITTING ON SIDE OF FLAT BED WITH BEDRAILS: A LITTLE
MOBILITY SCORE: 18
TOILETING: A LITTLE
HELP NEEDED FOR BATHING: A LITTLE
STANDING UP FROM CHAIR USING ARMS: A LITTLE
TURNING FROM BACK TO SIDE WHILE IN FLAT BAD: A LITTLE
MOBILITY SCORE: 16
TOILETING: A LITTLE
CLIMB 3 TO 5 STEPS WITH RAILING: TOTAL
HELP NEEDED FOR BATHING: A LITTLE
WALKING IN HOSPITAL ROOM: A LITTLE
MOBILITY SCORE: 17
HELP NEEDED FOR BATHING: A LITTLE
MOVING TO AND FROM BED TO CHAIR: A LITTLE
WALKING IN HOSPITAL ROOM: A LITTLE
MOVING FROM LYING ON BACK TO SITTING ON SIDE OF FLAT BED WITH BEDRAILS: A LITTLE
MOVING TO AND FROM BED TO CHAIR: A LITTLE
TOILETING: A LITTLE
MOVING TO AND FROM BED TO CHAIR: A LITTLE
WALKING IN HOSPITAL ROOM: A LITTLE

## 2024-08-25 ASSESSMENT — ACTIVITIES OF DAILY LIVING (ADL)
TOILETING: INDEPENDENT
ADEQUATE_TO_COMPLETE_ADL: YES
LACK_OF_TRANSPORTATION: NO
FEEDING YOURSELF: INDEPENDENT
WALKS IN HOME: INDEPENDENT
PATIENT'S MEMORY ADEQUATE TO SAFELY COMPLETE DAILY ACTIVITIES?: YES
ADL_ASSISTANCE: INDEPENDENT
JUDGMENT_ADEQUATE_SAFELY_COMPLETE_DAILY_ACTIVITIES: YES
DRESSING YOURSELF: INDEPENDENT
HEARING - LEFT EAR: FUNCTIONAL
HEARING - RIGHT EAR: FUNCTIONAL
GROOMING: INDEPENDENT
BATHING: INDEPENDENT
ADLS_ADDRESSED: NO
ASSISTIVE_DEVICE: EYEGLASSES

## 2024-08-25 ASSESSMENT — LIFESTYLE VARIABLES
HOW OFTEN DO YOU HAVE 6 OR MORE DRINKS ON ONE OCCASION: NEVER
AUDIT-C TOTAL SCORE: 0
AUDIT-C TOTAL SCORE: 0
SKIP TO QUESTIONS 9-10: 1
HOW OFTEN DO YOU HAVE A DRINK CONTAINING ALCOHOL: NEVER
HOW MANY STANDARD DRINKS CONTAINING ALCOHOL DO YOU HAVE ON A TYPICAL DAY: PATIENT DOES NOT DRINK

## 2024-08-25 ASSESSMENT — PATIENT HEALTH QUESTIONNAIRE - PHQ9
1. LITTLE INTEREST OR PLEASURE IN DOING THINGS: NOT AT ALL
SUM OF ALL RESPONSES TO PHQ9 QUESTIONS 1 & 2: 0
2. FEELING DOWN, DEPRESSED OR HOPELESS: NOT AT ALL

## 2024-08-25 ASSESSMENT — PAIN SCALES - GENERAL
PAINLEVEL_OUTOF10: 0 - NO PAIN

## 2024-08-25 ASSESSMENT — PAIN - FUNCTIONAL ASSESSMENT
PAIN_FUNCTIONAL_ASSESSMENT: 0-10

## 2024-08-25 NOTE — CARE PLAN
Problem: Infection related to problem list condition  Goal: Infection will resolve through treatment  Outcome: Progressing     Problem: Pain  Goal: Takes deep breaths with improved pain control throughout the shift  Outcome: Progressing  Goal: Turns in bed with improved pain control throughout the shift  Outcome: Progressing  Goal: Walks with improved pain control throughout the shift  Outcome: Progressing  Goal: Performs ADL's with improved pain control throughout shift  Outcome: Progressing  Goal: Participates in PT with improved pain control throughout the shift  Outcome: Progressing  Goal: Free from opioid side effects throughout the shift  Outcome: Progressing  Goal: Free from acute confusion related to pain meds throughout the shift  Outcome: Progressing     Problem: Pain - Adult  Goal: Verbalizes/displays adequate comfort level or baseline comfort level  Outcome: Progressing     Problem: Safety - Adult  Goal: Free from fall injury  Outcome: Progressing     Problem: Discharge Planning  Goal: Discharge to home or other facility with appropriate resources  Outcome: Progressing     Problem: Chronic Conditions and Co-morbidities  Goal: Patient's chronic conditions and co-morbidity symptoms are monitored and maintained or improved  Outcome: Progressing       The clinical goals for the shift include free from falls and injuries

## 2024-08-25 NOTE — PROGRESS NOTES
"Pharmacy Medication History Review    Neftali Beasley is a 69 y.o. male admitted for Back pain without sciatica. Pharmacy reviewed the patient's [id number] medications and allergies for accuracy.    Medications ADDED:  Peg 400/hypromellose/glycerin  Aspirin 81 mg  Medications CHANGED:  none  Medications REMOVED:   none    The list below reflects the updated PTA list.   Prior to Admission Medications   Prescriptions Last Dose Informant   Advair Diskus 250-50 mcg/dose diskus inhaler  Self   Sig: Inhale 1 puff 2 times a day.  Patient reports not taking    aspirin 81 mg EC tablet Past Week Self   Sig: Take 1 tablet (81 mg) by mouth once daily.   furosemide (Lasix) 40 mg tablet Past Week Self   Sig: Take 1 tablet (40 mg) by mouth once daily.   lisinopril 30 mg tablet Past Week Self   Sig: Take 1 tablet (30 mg) by mouth once daily.   peg 400-hypromellose-glycerin (ARTIFICAL TEARS) 1-0.2-0.2 % drops ophthalmic drops Past Week Self   Sig: Administer 1 drop into both eyes 3 times a day as needed.      Facility-Administered Medications: None        The list below reflects the updated allergy list. Please review each documented allergy for additional clarification and justification.  Allergies  Reviewed by Katelin Santiago, APRN-CNP on 8/24/2024   No Known Allergies         Patient accepts M2B at discharge.     Sources:   out patient fill history, OARRS, and patient interview     Additional Comments:  Patient is somewhat of a poor historian  Patient reports taking 3 total medications daily  Patient reports taking 81 mg asprin daily  Patient reports using artificial tears as needed        SHELLEY HARP  PGY-1 Pharmacy Resident  08/25/24     Secure Chat preferred   If no response call [email or web address] or [email or web address] \"Med Rec\"     "

## 2024-08-25 NOTE — PROGRESS NOTES
Physical Therapy    Physical Therapy Evaluation    Patient Name: Neftali Beasley  MRN: [id number]  Today's Date: 8/25/2024   Room: [address] [place] [address]-A  Time Calculation  Start Time: 1334  Stop Time: 1355  Time Calculation (min): 21 min    Assessment/Plan   PT Assessment  PT Assessment Results: Decreased endurance, Decreased mobility, Impaired balance  Rehab Prognosis: Excellent  Barriers to Discharge: None  Evaluation/Treatment Tolerance: Patient tolerated treatment well  End of Session Communication: Bedside nurse  Assessment Comment: Pt to benefit from ongoing PT services to address the above limitations and to prepare pt for timely return to prior level of function.  End of Session Patient Position: Bed, 3 rail up, Alarm off, not on at start of session  IP OR SWING BED PT PLAN  Inpatient or Swing Bed: Inpatient  PT Plan  Treatment/Interventions: Bed mobility, Gait training, Transfer training, Balance training, Neuromuscular re-education, Strengthening, Endurance training, Therapeutic exercise, Therapeutic activity, Home exercise program, Postural re-education  PT Plan: Ongoing PT  PT Frequency: 5 times per week  PT Discharge Recommendations: Low intensity level of continued care  Equipment Recommended upon Discharge:  (Bariatric walker)  PT Recommended Transfer Status: Assist x1, Assistive device  PT - OK to Discharge: Yes (When medically ready)      Subjective   General Visit Information:  Reason for Referral: Right sided lower back pain that started 8/23 while pt was transferring from toilet. Admitted to medicine service under observation for treatment of UTI and back pain.  Past Medical History Relevant to Rehab: HFpEF (last EF 70-75% 8/22/16), ROSALINDA, HTN, and obesity  Prior to Session Communication: Bedside nurse  Patient Position Received: Bed, 3 rail up, Alarm off, not on at start of session  Family/Caregiver Present: No  Caregiver Feedback: n/a  General Comment: Pt reports he bumped his R foot just before admission;  Therapist observed small areas of cut but healing skin on 2nd and 3rd digits on R foot.     Home Living:  Home Living  Type of Home:  (Upper level of a multi-family dwelling)  Lives With:  (Family lives downstairs, aide visits 4 drs/day 3 days/wk)  Home Adaptive Equipment:  (rollator, owns hospital bed but does not use)  Home Layout: One level  Home Access:  (Chair lift to enter (25 steps))  Bathroom Shower/Tub: Walk-in shower  Bathroom Equipment: Built-in shower seat    Prior Level of Function:  Prior Function Per Pt/Caregiver Report  ADL Assistance: Independent  Homemaking Assistance: Needs assistance (Aide helps with preparing some meals)  Ambulatory Assistance: Independent (MOD-I household distances with rollator)  Hand Dominance: Right  Prior Function Comments: Aide performs grocery shopping    Precautions:  Precautions  Hearing/Visual Limitations: Glasses. Hearing WFL  Medical Precautions: Fall precautions    Vital Signs:  Vital Signs  Heart Rate: 68  SpO2: 97 %  BP: (!) 190/88  MAP (mmHg): 68  BP Method: Automatic    Objective     Oxygen: 2 L/min via NC     Pain:  Pain Assessment  Pain Assessment: 0-10  0-10 (Numeric) Pain Score: 0 - No pain    Cognition:  Cognition  Overall Cognitive Status: Within Functional Limits  Orientation Level: Oriented X4  Insight: Within function limits  Impulsive: Within functional limits  Processing Speed: Within funtional limits      Extremity/Trunk Assessments:  Strength:                 RLE   RLE :  (Knee extension 5/5)    LLE   LLE :  (Knee extension 5/5)    General Assessments:         Activity Tolerance  Endurance: Endurance does not limit participation in activity  Early Mobility/Exercise Safety Screen: Proceed with mobilization - No exclusion criteria met  Sensation  Light Touch: No apparent deficits     Static Sitting Balance  Static Sitting-Balance Support: Bilateral upper extremity supported, Feet supported  Static Sitting-Level of Assistance: Close  supervision  Dynamic Sitting Balance  Dynamic Sitting-Balance Support: Bilateral upper extremity supported, Feet supported  Dynamic Sitting-Level of Assistance: Close supervision  Static Standing Balance  Static Standing-Balance Support: Bilateral upper extremity supported  Static Standing-Level of Assistance: Contact guard  Dynamic Standing Balance  Dynamic Standing-Balance Support: Bilateral upper extremity supported  Dynamic Standing-Level of Assistance: Contact guard  Dynamic Standing-Balance:  (Side stepping)  Dynamic Standing-Comments: 3    Functional Assessments:  Bed Mobility  Bed Mobility: Yes  Bed Mobility 1  Bed Mobility 1: Supine to sitting, Sitting to supine  Level of Assistance 1: Contact guard  Bed Mobility Comments 1: HOB 30 deg, uses bed rails    Transfers  Transfer: Yes  Transfer 1  Transfer From 1: Bed to  Transfer to 1: Stand  Technique 1: Sit to stand, Stand to sit  Transfer Device 1: Walker    Ambulation/Gait Training  Ambulation/Gait Training Performed: Yes  Ambulation/Gait Training 1  Surface 1: Level tile  Device 1: Bariatric rolling walker  Assistance 1: Contact guard  Quality of Gait 1: Wide base of support, Decreased step length, Antalgic (Verbal cues for self-monitoring activity tolerance.)  Comments/Distance (ft) 1: 3 steps laterally + 3 steps fwd/bwd at bedside    Stairs  Stairs: No         Outcome Measures:  [facility] Basic Mobility  Turning from your back to your side while in a flat bed without using bedrails: A little  Moving from lying on your back to sitting on the side of a flat bed without using bedrails: A little  Moving to and from bed to chair (including a wheelchair): A little  Standing up from a chair using your arms (e.g. wheelchair or bedside chair): A little  To walk in hospital room: A little  Climbing 3-5 steps with railing: Total (Pt does not ascend steps at baseline; Has a chair lift at home.)  Basic Mobility - Total Score: 16                        ICU Mobility  Screen  Early Mobility/Exercise Safety Screen: Proceed with mobilization - No exclusion criteria met                        Encounter Problems       Encounter Problems (Active)       PT Problem       Patient will perform bed mobility IND to reduce risk of developing decubitus ulcers.        Start:  08/25/24    Expected End:  09/08/24            Patient will perform sit to stand and stand to sit transfers MOD-I with LRD to increase functional strength.        Start:  08/25/24    Expected End:  09/08/24            Patient will ambulate at least 50 ft. MOD-I with LRD to improve tolerance of household distances.        Start:  08/25/24    Expected End:  09/08/24            Patient will perform introductory home exercise program as prescribed without cues.         Start:  08/25/24    Expected End:  09/08/24            BLE 5/5 throughout        Start:  08/25/24    Expected End:  09/08/24               Pain - Adult              Education Documentation  Precautions, taught by Yolie Willard PT at 8/25/2024  2:11 PM.  Learner: Patient  Readiness: Acceptance  Method: Explanation  Response: Verbalizes Understanding    Body Mechanics, taught by Yolie Willard PT at 8/25/2024  2:11 PM.  Learner: Patient  Readiness: Acceptance  Method: Explanation  Response: Verbalizes Understanding    Mobility Training, taught by Yolie Willard PT at 8/25/2024  2:11 PM.  Learner: Patient  Readiness: Acceptance  Method: Explanation  Response: Verbalizes Understanding    Education Comments  No comments found.            08/25/24 at 2:14 PM   Yolie Willard PT   Rehab Office: [phone]

## 2024-08-25 NOTE — PROGRESS NOTES
Pharmacy Admission Order Reconciliation Review    Neftali Beasley is a 69 y.o. male admitted for Back pain without sciatica. Pharmacy reviewed the patient's unreconciled admission medications.    Prior to admission medications that were reviewed and acted on by the pharmacist include:  Artifical tears  These medications have been reconciled.     Any other unreconcilied medications have been addressed and will be ordered or held by the patient's medical team. Medications addressed by the pharmacist may be added or changed by the patient's medical team at any time.    Erica Francois, PharmD  Transitions of Care Pharmacist  [facility]  Please reach out via Secure Chat for questions

## 2024-08-25 NOTE — CARE PLAN
The patient's goals for the shift include  Free from injury    The clinical goals for the shift include Control infection      Problem: Infection related to problem list condition  Goal: Infection will resolve through treatment  Outcome: Progressing     Problem: Pain  Goal: Takes deep breaths with improved pain control throughout the shift  Outcome: Progressing  Goal: Turns in bed with improved pain control throughout the shift  Outcome: Progressing  Goal: Walks with improved pain control throughout the shift  Outcome: Progressing  Goal: Performs ADL's with improved pain control throughout shift  Outcome: Progressing  Goal: Participates in PT with improved pain control throughout the shift  Outcome: Progressing  Goal: Free from opioid side effects throughout the shift  Outcome: Progressing  Goal: Free from acute confusion related to pain meds throughout the shift  Outcome: Progressing     Problem: Safety - Adult  Goal: Free from fall injury  Outcome: Progressing     Problem: Chronic Conditions and Co-morbidities  Goal: Patient's chronic conditions and co-morbidity symptoms are monitored and maintained or improved  Outcome: Progressing

## 2024-08-25 NOTE — PROGRESS NOTES
Neftali Beasley is a 69 y.o. male on hospital day 0 of admission presenting with Back pain without sciatica.    Subjective     Patient states back pain is gone. LE swelling present but improved. Awaiting PT/OT evaluation.    Objective     GENERAL APPEARANCE: A&Ox3, appears in no acute distress  HEAD: normocephalic, atraumatic  THROAT: Oral cavity and pharynx normal. No inflammation, swelling, exudate, or lesions.  NECK: Neck supple, non-tender without lymphadenopathy, masses or thyromegaly.  CARDIAC: Normal S1 and S2. No S3, S4 or murmurs. Rhythm is regular. There is no peripheral edema, cyanosis or pallor. Extremities are warm and well perfused. No carotid bruits.  LUNGS: Clear to auscultation bilaterally without rales, rhonchi, wheezing or diminished breath sounds.  ABDOMEN: Positive bowel sounds. Soft, nondistended, nontender. No guarding or rebound. No masses.  EXTREMITIES: No significant deformity or joint abnormality. No edema. Peripheral pulses intact. No varicosities.  SKIN: Skin normal color, texture and turgor with no lesions or rash  PSYCHIATRIC: oriented to person, place, and time, good judgement and reason, without hallucinations, abnormal affect or abnormal behaviors during the examination. Patient is not suicidal.        Last Recorded Vitals  Blood pressure 158/76, pulse 78, temperature 36.7 °C (98.1 °F), temperature source Temporal, resp. rate 17, weight (!) 177 kg (390 lb), SpO2 99%.  Intake/Output last 3 Shifts:  No intake/output data recorded.    Relevant Results  Lab Results   Component Value Date    WBC 5.5 08/25/2024    HGB 9.5 (L) 08/25/2024    HCT 36.2 (L) 08/25/2024    MCV 83 08/25/2024     (L) 08/25/2024      Lab Results   Component Value Date    GLUCOSE 90 08/25/2024    CALCIUM 9.0 08/25/2024     08/25/2024    K 4.2 08/25/2024    CO2 34 (H) 08/25/2024    CL 97 (L) 08/25/2024    BUN 17 08/25/2024    CREATININE 1.03 08/25/2024     Scheduled medications  cefTRIAXone, 1 g,  "intravenous, q24h  enoxaparin, 40 mg, subcutaneous, q24h  fluticasone furoate-vilanteroL, 1 puff, inhalation, Daily  furosemide, 40 mg, oral, Daily  lisinopril, 30 mg, oral, Daily  pantoprazole, 40 mg, oral, Daily  sennosides-docusate sodium, 2 tablet, oral, BID      Continuous medications     PRN medications  PRN medications: acetaminophen, melatonin, methocarbamol, ondansetron ODT **OR** ondansetron, oxyCODONE, psyllium    Assessment/Plan     back pain  - Described right sided \"by my kidney\"  - Tylenol 650 mg q6h PRN mild pain, Oxycodone 5 mg q6h PRN mod-severe pain (x4 doses), Lidocaine patch, and Robaxin 500 mg PO q8h PRN back pain/spasms  - Back pain has resolved  - PT/OT consulted, however, patient did state he prefers to go home with home care PT/OT when discharged     UTI  - Urine culture: gram negative bacilli, awaiting final  - No prior positive urine culture results found in medical record  - Antibiotic Regimen: Rocephin 1g Q24hr (8/24-)  - Monitor kidney function; Provide maintenance fluids if needed; Encourage oral hydration      gastritis  fatty liver  - CT A/P: Hepatic steatosis with morphologic changes of underlying cirrhosis.  - additional labs ordered for workup: Hepatic function panel, acute hepatitis panel, coag screen  - pt will need f/u appt scheduled with hepatology on discharge for outpatient workup for cirrhosis  - start Protonix 40 mg PO every day     HTN  HFpEF (last EF 70-75% 8/22/16)  -  on admit  - continue home dose of Lisinopril 30 mg PO every day and Lasix 40 mg PO every day  - last Echo done 8/22/16: The left ventricular systolic function is hyperdynamic with a 70-75% estimated ejection fraction. Increased LV mass.Severely increased left ventricular septal thickness. Spectral Doppler shows an abnormal pattern of left ventricular diastolic filling. The left atrium is normal in size. Mildly elevated RVSP. Mildly dilated aortic root. The pulmonary artery is not well " visualized.  - pt will need follow-up appt with cardiology to re-establish care and outpatient Echo for further evaluation of HF      Code status: FULL CODE  Dispo: PT/OT renetta Hutson MD     The patient encounter includes all but not limited to; Evaluation of laboratory results, pertinent imaging, and vital signs. Daily updates are discussed with any consulting services and family/medical power of  as needed. The patient's discharge  process begins at admission and daily contact with the patient's TCC and SW is pertinent in their efficient and safe discharge.

## 2024-08-26 ENCOUNTER — PHARMACY VISIT (OUTPATIENT)
Dept: PHARMACY | Facility: CLINIC | Age: 69
End: 2024-08-26
Payer: COMMERCIAL

## 2024-08-26 VITALS
WEIGHT: 315 LBS | OXYGEN SATURATION: 96 % | RESPIRATION RATE: 18 BRPM | TEMPERATURE: 97.2 F | DIASTOLIC BLOOD PRESSURE: 81 MMHG | SYSTOLIC BLOOD PRESSURE: 158 MMHG | HEART RATE: 61 BPM

## 2024-08-26 LAB
ALBUMIN SERPL BCP-MCNC: 3.6 G/DL (ref 3.4–5)
ANION GAP SERPL CALC-SCNC: 10 MMOL/L (ref 10–20)
BACTERIA UR CULT: ABNORMAL
BUN SERPL-MCNC: 11 MG/DL (ref 6–23)
CALCIUM SERPL-MCNC: 8.9 MG/DL (ref 8.6–10.6)
CHLORIDE SERPL-SCNC: 97 MMOL/L (ref 98–107)
CO2 SERPL-SCNC: 39 MMOL/L (ref 21–32)
CREAT SERPL-MCNC: 0.86 MG/DL (ref 0.5–1.3)
EGFRCR SERPLBLD CKD-EPI 2021: >90 ML/MIN/1.73M*2
GLUCOSE SERPL-MCNC: 108 MG/DL (ref 74–99)
PHOSPHATE SERPL-MCNC: 2.4 MG/DL (ref 2.5–4.9)
POTASSIUM SERPL-SCNC: 3.7 MMOL/L (ref 3.5–5.3)
SODIUM SERPL-SCNC: 142 MMOL/L (ref 136–145)

## 2024-08-26 PROCEDURE — 96372 THER/PROPH/DIAG INJ SC/IM: CPT | Performed by: NURSE PRACTITIONER

## 2024-08-26 PROCEDURE — RXMED WILLOW AMBULATORY MEDICATION CHARGE

## 2024-08-26 PROCEDURE — G0378 HOSPITAL OBSERVATION PER HR: HCPCS

## 2024-08-26 PROCEDURE — 97165 OT EVAL LOW COMPLEX 30 MIN: CPT | Mod: GO

## 2024-08-26 PROCEDURE — 2500000004 HC RX 250 GENERAL PHARMACY W/ HCPCS (ALT 636 FOR OP/ED): Performed by: NURSE PRACTITIONER

## 2024-08-26 PROCEDURE — 2500000001 HC RX 250 WO HCPCS SELF ADMINISTERED DRUGS (ALT 637 FOR MEDICARE OP): Performed by: NURSE PRACTITIONER

## 2024-08-26 PROCEDURE — 36415 COLL VENOUS BLD VENIPUNCTURE: CPT | Performed by: STUDENT IN AN ORGANIZED HEALTH CARE EDUCATION/TRAINING PROGRAM

## 2024-08-26 PROCEDURE — 80069 RENAL FUNCTION PANEL: CPT | Performed by: STUDENT IN AN ORGANIZED HEALTH CARE EDUCATION/TRAINING PROGRAM

## 2024-08-26 RX ORDER — ACETAMINOPHEN 325 MG/1
650 TABLET ORAL EVERY 6 HOURS PRN
Start: 2024-08-26

## 2024-08-26 RX ORDER — METHOCARBAMOL 500 MG/1
500 TABLET, FILM COATED ORAL EVERY 8 HOURS PRN
Qty: 12 TABLET | Refills: 0 | Status: SHIPPED | OUTPATIENT
Start: 2024-08-26

## 2024-08-26 RX ORDER — CIPROFLOXACIN 500 MG/1
500 TABLET ORAL 2 TIMES DAILY
Qty: 6 TABLET | Refills: 0 | Status: SHIPPED | OUTPATIENT
Start: 2024-08-26 | End: 2024-08-29

## 2024-08-26 RX ADMIN — CEFTRIAXONE SODIUM 1 G: 1 INJECTION, SOLUTION INTRAVENOUS at 12:26

## 2024-08-26 RX ADMIN — PANTOPRAZOLE SODIUM 40 MG: 40 TABLET, DELAYED RELEASE ORAL at 09:59

## 2024-08-26 RX ADMIN — FUROSEMIDE 40 MG: 40 TABLET ORAL at 09:59

## 2024-08-26 RX ADMIN — LISINOPRIL 30 MG: 20 TABLET ORAL at 09:59

## 2024-08-26 RX ADMIN — ENOXAPARIN SODIUM 40 MG: 100 INJECTION SUBCUTANEOUS at 09:59

## 2024-08-26 ASSESSMENT — COGNITIVE AND FUNCTIONAL STATUS - GENERAL
TURNING FROM BACK TO SIDE WHILE IN FLAT BAD: A LITTLE
CLIMB 3 TO 5 STEPS WITH RAILING: A LOT
MOVING FROM LYING ON BACK TO SITTING ON SIDE OF FLAT BED WITH BEDRAILS: A LITTLE
MOBILITY SCORE: 17
DRESSING REGULAR UPPER BODY CLOTHING: A LITTLE
STANDING UP FROM CHAIR USING ARMS: A LITTLE
DRESSING REGULAR UPPER BODY CLOTHING: A LITTLE
EATING MEALS: A LITTLE
HELP NEEDED FOR BATHING: A LITTLE
WALKING IN HOSPITAL ROOM: A LITTLE
DRESSING REGULAR LOWER BODY CLOTHING: A LITTLE
PERSONAL GROOMING: A LITTLE
PERSONAL GROOMING: A LITTLE
DRESSING REGULAR LOWER BODY CLOTHING: A LOT
MOVING TO AND FROM BED TO CHAIR: A LITTLE
DAILY ACTIVITIY SCORE: 19
TOILETING: A LITTLE
HELP NEEDED FOR BATHING: A LITTLE
DAILY ACTIVITIY SCORE: 18

## 2024-08-26 ASSESSMENT — PAIN - FUNCTIONAL ASSESSMENT
PAIN_FUNCTIONAL_ASSESSMENT: 0-10

## 2024-08-26 ASSESSMENT — ACTIVITIES OF DAILY LIVING (ADL)
ADL_ASSISTANCE: INDEPENDENT
BATHING_ASSISTANCE: MODERATE

## 2024-08-26 ASSESSMENT — PAIN SCALES - GENERAL
PAINLEVEL_OUTOF10: 0 - NO PAIN
PAINLEVEL_OUTOF10: 0 - NO PAIN

## 2024-08-26 NOTE — PROGRESS NOTES
Per TCC, patient needs bariatric stretcher with 02 for dc home today. [facility] confirmed 1430 bariatric stretcher with wings and 3L 02 transport for discharge home. SW updated TCC, MD, bedside RN and the floor.     WILTON Lowry

## 2024-08-26 NOTE — PROGRESS NOTES
I spoke with Neftali at the bedside regarding discharge planning and home going needs. Patient states that he lives in the upper half of a two family home alone he does use a stair lyft from the ground floor up to his part of the home. Patient states that he receives home health aide services from [facility] 3 days a week 4 hours a day. He has crutches, walker and rollator in the home. Patient states that he wears 2.5 liters of oxygen continuously but that he does not know who delivers his oxygen. I will continue to follow this patient with a safe discharge plan. ADDENDUM: Patient is medically cleared for discharge home today recommended low intensity therapy but refusing at this time stating that he has home health aid assistance in the home. Dr. Hutson is aware.

## 2024-08-26 NOTE — CARE PLAN
The patient's goals for the shift include      The clinical goals for the shift include pt will remain safe and free from harm throughout my shift      Problem: Infection related to problem list condition  Goal: Infection will resolve through treatment  Outcome: Progressing     Problem: Pain  Goal: Takes deep breaths with improved pain control throughout the shift  Outcome: Progressing  Goal: Turns in bed with improved pain control throughout the shift  Outcome: Progressing  Goal: Walks with improved pain control throughout the shift  Outcome: Progressing  Goal: Performs ADL's with improved pain control throughout shift  Outcome: Progressing  Goal: Participates in PT with improved pain control throughout the shift  Outcome: Progressing  Goal: Free from opioid side effects throughout the shift  Outcome: Progressing  Goal: Free from acute confusion related to pain meds throughout the shift  Outcome: Progressing     Problem: Pain - Adult  Goal: Verbalizes/displays adequate comfort level or baseline comfort level  Outcome: Progressing     Problem: Safety - Adult  Goal: Free from fall injury  Outcome: Progressing     Problem: Discharge Planning  Goal: Discharge to home or other facility with appropriate resources  Outcome: Progressing     Problem: Chronic Conditions and Co-morbidities  Goal: Patient's chronic conditions and co-morbidity symptoms are monitored and maintained or improved  Outcome: Progressing

## 2024-10-03 NOTE — DISCHARGE SUMMARY
"Discharge Diagnosis  Back pain without sciatica    Issues Requiring Follow-Up  N/A    Discharge Meds     Medication List      START taking these medications     acetaminophen 325 mg tablet; Commonly known as: Tylenol; Take 2 tablets   (650 mg) by mouth every 6 hours if needed for mild pain (1 - 3), moderate   pain (4 - 6) or fever (temp greater than 38.0 C).   methocarbamol 500 mg tablet; Commonly known as: Robaxin; Take 1 tablet   (500 mg) by mouth every 8 hours if needed for muscle spasms (back pain).     CONTINUE taking these medications     aspirin 81 mg EC tablet   furosemide 40 mg tablet; Commonly known as: Lasix   lisinopril 30 mg tablet   peg 400-hypromellose-glycerin 1-0.2-0.2 % drops ophthalmic drops;   Commonly known as: ARTIFICAL TEARS     STOP taking these medications     Advair Diskus 250-50 mcg/dose diskus inhaler; Generic drug: fluticasone   propion-salmeteroL     ASK your doctor about these medications     ciprofloxacin 500 mg tablet; Commonly known as: Cipro; Take 1 tablet   (500 mg) by mouth 2 times a day for 3 days.; Ask about: Should I take this   medication?       Test Results Pending At Discharge  N/A    Hospital Course    back pain  - Described right sided \"by my kidney\"  - Tylenol 650 mg q6h PRN mild pain, Oxycodone 5 mg q6h PRN mod-severe pain (x4 doses), Lidocaine patch, and Robaxin 500 mg PO q8h PRN back pain/spasms  - Back pain has resolved  - PT/OT consulted, however, patient did state he prefers to go home with home care PT/OT when discharged     UTI  - Urine culture: gram negative bacilli, awaiting final  - No prior positive urine culture results found in medical record  - Antibiotic Regimen: Rocephin 1g Q24hr (8/24-)  - Monitor kidney function; Provide maintenance fluids if needed; Encourage oral hydration     Patient presented with back pain and found to have UTI. Pain improved with treatment. She will discharge home to complete course with Ciprofloxacin.     Pertinent Physical Exam " At Time of Discharge    GENERAL APPEARANCE: A&Ox3, appears in no acute distress  HEAD: normocephalic, atraumatic  THROAT: Oral cavity and pharynx normal. No inflammation, swelling, exudate, or lesions.  NECK: Neck supple, non-tender without lymphadenopathy, masses or thyromegaly.  CARDIAC: Normal S1 and S2. No S3, S4 or murmurs. Rhythm is regular. There is no peripheral edema, cyanosis or pallor. Extremities are warm and well perfused. No carotid bruits.  LUNGS: Clear to auscultation bilaterally without rales, rhonchi, wheezing or diminished breath sounds.  ABDOMEN: Positive bowel sounds. Soft, nondistended, nontender. No guarding or rebound. No masses.  EXTREMITIES: No significant deformity or joint abnormality. No edema. Peripheral pulses intact. No varicosities.  SKIN: Skin normal color, texture and turgor with no lesions or rash  PSYCHIATRIC: oriented to person, place, and time, good judgement and reason, without hallucinations, abnormal affect or abnormal behaviors during the examination. Patient is not suicidal.        Outpatient Follow-Up  No future appointments.      Fish Hutson MD